# Patient Record
(demographics unavailable — no encounter records)

---

## 2025-02-24 NOTE — HEALTH RISK ASSESSMENT
[Former] : Former [15-19] : 15-19 [> 15 Years] : > 15 Years [Patient reported colonoscopy was normal] : Patient reported colonoscopy was normal [Yes] : Yes [4 or more  times a week (4 pts)] : 4 or more  times a week (4 points) [1 or 2 (0 pts)] : 1 or 2 (0 points) [Never (0 pts)] : Never (0 points) [No] : In the past 12 months have you used drugs other than those required for medical reasons? No [0] : 2) Feeling down, depressed, or hopeless: Not at all (0) [PHQ-2 Negative - No further assessment needed] : PHQ-2 Negative - No further assessment needed [de-identified] : beer, vodka soda  [Audit-CScore] : 4 [de-identified] : goes to the gym on average 2-3 days a week  [de-identified] : diet is generally good- can improve  [[id number]] : 0 [ColonoscopyDate] : 03/2022 [ColonoscopyComments] : Internal hemorrhoids, due 03/2032

## 2025-02-24 NOTE — ASSESSMENT
[FreeTextEntry1] : Health care maintenance: check blood work, blood drawn in office follow up with optometry/ophthalmology and dentist for routine exams when due up to date with colonoscopy  follow up with dermatology for skin examination  c/w diet and exercise  declines Flu vaccine recommend Shingles vaccination   Leukopenia: check blood work consider f/u with hematology/oncology   Vitamin D deficiency: check blood work

## 2025-02-24 NOTE — PHYSICAL EXAM
[No Acute Distress] : no acute distress [Well Nourished] : well nourished [Well Developed] : well developed [Well-Appearing] : well-appearing [Normal Sclera/Conjunctiva] : normal sclera/conjunctiva [PERRL] : pupils equal round and reactive to light [Normal Outer Ear/Nose] : the outer ears and nose were normal in appearance [Normal TMs] : both tympanic membranes were normal [Normal Appearance] : was normal in appearance [Neck Supple] : was supple [Normal] : the thyroid was normal [No Respiratory Distress] : no respiratory distress  [Clear to Auscultation] : lungs were clear to auscultation bilaterally [Normal Rate] : normal rate  [Regular Rhythm] : with a regular rhythm [Normal S1, S2] : normal S1 and S2 [No Murmur] : no murmur heard [No Carotid Bruits] : no carotid bruits [No Edema] : there was no peripheral edema [Soft] : abdomen soft [Non Tender] : non-tender [Normal Bowel Sounds] : normal bowel sounds [Normal Posterior Cervical Nodes] : no posterior cervical lymphadenopathy [Normal Anterior Cervical Nodes] : no anterior cervical lymphadenopathy [No Spinal Tenderness] : no spinal tenderness [Grossly Normal Strength/Tone] : grossly normal strength/tone [No Rash] : no rash [No Focal Deficits] : no focal deficits [Normal Affect] : the affect was normal [Alert and Oriented x3] : oriented to person, place, and time [Normal Mood] : the mood was normal [de-identified] : tattoos, multiple nevi

## 2025-02-24 NOTE — HISTORY OF PRESENT ILLNESS
[FreeTextEntry1] : Annual physical [de-identified] : 50 year old male presents for annual physical. He currently feels well today.   has h/o sarcoidosis- seen previously by pulmonology, Dr. Avery Lieberman (last seen in 2010) at the time had normal spirometry, normal chest x-ray, appeared to have cleared sarcoidosis, was advised to f/u in 2 years, patient had difficulty following up at the time no complaints- no cough, no sob, no fever  has h/o leukopenia- seen by hematology/oncology previously, Dr. Howard Pike, last seen around 2012  up to date with colonoscopy   had a vasectomy   declines flu vaccine